# Patient Record
Sex: FEMALE | Race: WHITE | NOT HISPANIC OR LATINO
[De-identification: names, ages, dates, MRNs, and addresses within clinical notes are randomized per-mention and may not be internally consistent; named-entity substitution may affect disease eponyms.]

---

## 2018-03-09 ENCOUNTER — TRANSCRIPTION ENCOUNTER (OUTPATIENT)
Age: 48
End: 2018-03-09

## 2018-06-05 ENCOUNTER — TRANSCRIPTION ENCOUNTER (OUTPATIENT)
Age: 48
End: 2018-06-05

## 2018-06-09 ENCOUNTER — TRANSCRIPTION ENCOUNTER (OUTPATIENT)
Age: 48
End: 2018-06-09

## 2018-06-25 ENCOUNTER — TRANSCRIPTION ENCOUNTER (OUTPATIENT)
Age: 48
End: 2018-06-25

## 2018-10-23 ENCOUNTER — TRANSCRIPTION ENCOUNTER (OUTPATIENT)
Age: 48
End: 2018-10-23

## 2019-04-02 ENCOUNTER — TRANSCRIPTION ENCOUNTER (OUTPATIENT)
Age: 49
End: 2019-04-02

## 2020-01-14 ENCOUNTER — TRANSCRIPTION ENCOUNTER (OUTPATIENT)
Age: 50
End: 2020-01-14

## 2020-02-09 ENCOUNTER — TRANSCRIPTION ENCOUNTER (OUTPATIENT)
Age: 50
End: 2020-02-09

## 2020-05-01 ENCOUNTER — OUTPATIENT (OUTPATIENT)
Dept: OUTPATIENT SERVICES | Facility: HOSPITAL | Age: 50
LOS: 1 days | End: 2020-05-01
Payer: MEDICAID

## 2020-05-01 PROCEDURE — G9001: CPT

## 2020-05-05 DIAGNOSIS — Z71.89 OTHER SPECIFIED COUNSELING: ICD-10-CM

## 2020-12-07 ENCOUNTER — TRANSCRIPTION ENCOUNTER (OUTPATIENT)
Age: 50
End: 2020-12-07

## 2020-12-15 ENCOUNTER — TRANSCRIPTION ENCOUNTER (OUTPATIENT)
Age: 50
End: 2020-12-15

## 2020-12-15 ENCOUNTER — EMERGENCY (EMERGENCY)
Facility: HOSPITAL | Age: 50
LOS: 0 days | Discharge: HOME | End: 2020-12-16
Attending: EMERGENCY MEDICINE | Admitting: EMERGENCY MEDICINE
Payer: MEDICAID

## 2020-12-15 VITALS
TEMPERATURE: 98 F | DIASTOLIC BLOOD PRESSURE: 59 MMHG | HEART RATE: 84 BPM | SYSTOLIC BLOOD PRESSURE: 123 MMHG | RESPIRATION RATE: 18 BRPM | OXYGEN SATURATION: 95 %

## 2020-12-15 DIAGNOSIS — M54.6 PAIN IN THORACIC SPINE: ICD-10-CM

## 2020-12-15 DIAGNOSIS — R07.89 OTHER CHEST PAIN: ICD-10-CM

## 2020-12-15 DIAGNOSIS — Z88.1 ALLERGY STATUS TO OTHER ANTIBIOTIC AGENTS STATUS: ICD-10-CM

## 2020-12-15 LAB
ALBUMIN SERPL ELPH-MCNC: 4.4 G/DL — SIGNIFICANT CHANGE UP (ref 3.5–5.2)
ALP SERPL-CCNC: 109 U/L — SIGNIFICANT CHANGE UP (ref 30–115)
ALT FLD-CCNC: 16 U/L — SIGNIFICANT CHANGE UP (ref 0–41)
ANION GAP SERPL CALC-SCNC: 15 MMOL/L — HIGH (ref 7–14)
APTT BLD: 29.4 SEC — SIGNIFICANT CHANGE UP (ref 27–39.2)
AST SERPL-CCNC: 24 U/L — SIGNIFICANT CHANGE UP (ref 0–41)
BASOPHILS # BLD AUTO: 0.06 K/UL — SIGNIFICANT CHANGE UP (ref 0–0.2)
BASOPHILS NFR BLD AUTO: 1.1 % — HIGH (ref 0–1)
BILIRUB SERPL-MCNC: 0.8 MG/DL — SIGNIFICANT CHANGE UP (ref 0.2–1.2)
BUN SERPL-MCNC: 17 MG/DL — SIGNIFICANT CHANGE UP (ref 10–20)
CALCIUM SERPL-MCNC: 9.1 MG/DL — SIGNIFICANT CHANGE UP (ref 8.5–10.1)
CHLORIDE SERPL-SCNC: 102 MMOL/L — SIGNIFICANT CHANGE UP (ref 98–110)
CO2 SERPL-SCNC: 23 MMOL/L — SIGNIFICANT CHANGE UP (ref 17–32)
CREAT SERPL-MCNC: 0.7 MG/DL — SIGNIFICANT CHANGE UP (ref 0.7–1.5)
EOSINOPHIL # BLD AUTO: 0.23 K/UL — SIGNIFICANT CHANGE UP (ref 0–0.7)
EOSINOPHIL NFR BLD AUTO: 4 % — SIGNIFICANT CHANGE UP (ref 0–8)
GLUCOSE SERPL-MCNC: 84 MG/DL — SIGNIFICANT CHANGE UP (ref 70–99)
HCG SERPL QL: NEGATIVE — SIGNIFICANT CHANGE UP
HCT VFR BLD CALC: 36.5 % — LOW (ref 37–47)
HGB BLD-MCNC: 12.4 G/DL — SIGNIFICANT CHANGE UP (ref 12–16)
IMM GRANULOCYTES NFR BLD AUTO: 0.2 % — SIGNIFICANT CHANGE UP (ref 0.1–0.3)
INR BLD: 0.92 RATIO — SIGNIFICANT CHANGE UP (ref 0.65–1.3)
LYMPHOCYTES # BLD AUTO: 1.94 K/UL — SIGNIFICANT CHANGE UP (ref 1.2–3.4)
LYMPHOCYTES # BLD AUTO: 34 % — SIGNIFICANT CHANGE UP (ref 20.5–51.1)
MAGNESIUM SERPL-MCNC: 2 MG/DL — SIGNIFICANT CHANGE UP (ref 1.8–2.4)
MCHC RBC-ENTMCNC: 30.8 PG — SIGNIFICANT CHANGE UP (ref 27–31)
MCHC RBC-ENTMCNC: 34 G/DL — SIGNIFICANT CHANGE UP (ref 32–37)
MCV RBC AUTO: 90.8 FL — SIGNIFICANT CHANGE UP (ref 81–99)
MONOCYTES # BLD AUTO: 0.68 K/UL — HIGH (ref 0.1–0.6)
MONOCYTES NFR BLD AUTO: 11.9 % — HIGH (ref 1.7–9.3)
NEUTROPHILS # BLD AUTO: 2.79 K/UL — SIGNIFICANT CHANGE UP (ref 1.4–6.5)
NEUTROPHILS NFR BLD AUTO: 48.8 % — SIGNIFICANT CHANGE UP (ref 42.2–75.2)
NRBC # BLD: 0 /100 WBCS — SIGNIFICANT CHANGE UP (ref 0–0)
PLATELET # BLD AUTO: 205 K/UL — SIGNIFICANT CHANGE UP (ref 130–400)
POTASSIUM SERPL-MCNC: 4.8 MMOL/L — SIGNIFICANT CHANGE UP (ref 3.5–5)
POTASSIUM SERPL-SCNC: 4.8 MMOL/L — SIGNIFICANT CHANGE UP (ref 3.5–5)
PROT SERPL-MCNC: 7.6 G/DL — SIGNIFICANT CHANGE UP (ref 6–8)
PROTHROM AB SERPL-ACNC: 10.6 SEC — SIGNIFICANT CHANGE UP (ref 9.95–12.87)
RBC # BLD: 4.02 M/UL — LOW (ref 4.2–5.4)
RBC # FLD: 12.5 % — SIGNIFICANT CHANGE UP (ref 11.5–14.5)
SODIUM SERPL-SCNC: 140 MMOL/L — SIGNIFICANT CHANGE UP (ref 135–146)
TROPONIN T SERPL-MCNC: <0.01 NG/ML — SIGNIFICANT CHANGE UP
TROPONIN T SERPL-MCNC: <0.01 NG/ML — SIGNIFICANT CHANGE UP
WBC # BLD: 5.71 K/UL — SIGNIFICANT CHANGE UP (ref 4.8–10.8)
WBC # FLD AUTO: 5.71 K/UL — SIGNIFICANT CHANGE UP (ref 4.8–10.8)

## 2020-12-15 PROCEDURE — 71045 X-RAY EXAM CHEST 1 VIEW: CPT | Mod: 26

## 2020-12-15 PROCEDURE — 93010 ELECTROCARDIOGRAM REPORT: CPT | Mod: 77

## 2020-12-15 PROCEDURE — 93010 ELECTROCARDIOGRAM REPORT: CPT

## 2020-12-15 PROCEDURE — 99220: CPT

## 2020-12-15 NOTE — ED PROVIDER NOTE - PROGRESS NOTE DETAILS
I have personally performed a history and physical exam on this patient and personally directed the management of the patient. 50yo woman no significant PMH c/o upper back pain x 1 week and chest pressure/discomfort over the last 2 days, constant, without associated SOB, nausea, vomiting, sweating. No prior episodes, has not tried OTC meds for pain. No exertional component and no change with position. She has been tested for COVID multiple times this week (all negative) because she has been so concerned about sx. VS, exam as noted, pt nontoxic appearing, lungs CTA, CVS1S2 RRR abd soft, NT, no peripheral edema. Doubt ACS or PE as cause for sx, will check EKG/labs/CXR, likely CDU for low risk chest pain workup.

## 2020-12-15 NOTE — ED PROVIDER NOTE - COVID-19 ORDERING FACILITY
[facility]  [facility]

## 2020-12-15 NOTE — ED ADULT NURSE NOTE - CAS DISCH ACCOMP BY
Pt returning call, screening questions asked    Pt advised  will be asked to wait in car during appt Self

## 2020-12-15 NOTE — ED PROVIDER NOTE - CLINICAL SUMMARY MEDICAL DECISION MAKING FREE TEXT BOX
Labs, EKG, CXR ok. Doubt CAD, but pt has had multiple UCC and ED visits over these sx, will place in CDU for CCTA vs stress in the morning. Pt comfortable with plan.

## 2020-12-15 NOTE — ED PROVIDER NOTE - OBJECTIVE STATEMENT
48 yo F with no significant PMHx presents to the ED c/o mild upper back pain x 1 week and mid-sternal chest pressure x 2 days. Symptoms are constant and not worsened with exertion. She went to [facility] today and was advised to go to ED for further workup. She denies hx of cardiac workup in the past. She denies aggravating/alleviating factors. She denies taking medication to improve symptoms. Pt denies fever, chills, nausea, vomiting, abdominal pain, diarrhea, headache, dizziness, weakness, SOB, LOC, trauma, urinary symptoms, cough, calf pain/swelling, recent travel, recent surgery.

## 2020-12-15 NOTE — ED CDU PROVIDER INITIAL DAY NOTE - ATTENDING CONTRIBUTION TO CARE
48 y/o f w/ no pmhx presented for chest pain radiating to mid- back, for ~ 1 week, currently asymptomatic. No fever, chills, n/v, sob, pleuritic cp, palpitations, diaphoresis, cough, ha/lh/dizziness, numbness/tingling, neck pain/ stiffness, abd pain, diarrhea, constipation, melena/brbpr, urinary symptoms, trauma, weakness, edema, calf pain/swelling/erythema, sick contacts, recent travel or rash.   Family hx of Brother with MI. Does not have a cardiologist.     on exam: female pt sitting on stretcher speaking full sentences, no rash, mmm, neck supple. non-tender , radial pulses 2/4 b/l, no jvd, no pain to palpation to chest wall, no pain with movement/ not reproducible, ctabl w/ breath sounds present b/l, no wheezing or crackles, no accessory muscle use, no tachypnea, no stridor, bs present throughout all 4 quadrants, abd soft, nd, nt, no rebound tenderness or guarding, no cvat, FROM of ext, no spinous ttp, no palpable shelves or step offs, no calf pain/swelling/erythema, AAOx3. motor 5/5 and sensation intact throughout upper and lower ext. no focal deficits. NIH 0.     serum preg negative, ekg x 2 with no ischemic changes, trop x 2 negative, cxr negative, pending ccta, will continue to monitor and reassess. pt aware of plan and agrees.

## 2020-12-15 NOTE — ED CDU PROVIDER INITIAL DAY NOTE - MEDICAL DECISION MAKING DETAILS
serum preg negative, ekg x 2 with no ischemic changes, trop x 2 negative, cxr negative, pending ccta, will continue to monitor and reassess. pt aware of plan and agrees.

## 2020-12-15 NOTE — ED CDU PROVIDER INITIAL DAY NOTE - OBJECTIVE STATEMENT
50y/o female with no significant pmh, pt. c/o mid sternal cp associated with mid back pain for a bout 1 wk. pt. denies sob, fever, chills, cough, dizziness, vomiting, abdominal pain. cp is not exertional, not related to eating, not pleuritic. pt. does not have a cardiologist. pt.'s brother had mi in his 50s.

## 2020-12-16 VITALS — HEART RATE: 60 BPM | DIASTOLIC BLOOD PRESSURE: 59 MMHG | SYSTOLIC BLOOD PRESSURE: 104 MMHG

## 2020-12-16 PROCEDURE — 75574 CT ANGIO HRT W/3D IMAGE: CPT | Mod: 26

## 2020-12-16 PROCEDURE — 99217: CPT

## 2020-12-16 RX ORDER — ACETAMINOPHEN 500 MG
650 TABLET ORAL ONCE
Refills: 0 | Status: COMPLETED | OUTPATIENT
Start: 2020-12-16 | End: 2020-12-16

## 2020-12-16 RX ORDER — METOPROLOL TARTRATE 50 MG
100 TABLET ORAL ONCE
Refills: 0 | Status: COMPLETED | OUTPATIENT
Start: 2020-12-16 | End: 2020-12-16

## 2020-12-16 RX ORDER — SODIUM CHLORIDE 9 MG/ML
1000 INJECTION INTRAMUSCULAR; INTRAVENOUS; SUBCUTANEOUS ONCE
Refills: 0 | Status: COMPLETED | OUTPATIENT
Start: 2020-12-16 | End: 2020-12-16

## 2020-12-16 RX ADMIN — Medication 100 MILLIGRAM(S): at 07:57

## 2020-12-16 RX ADMIN — Medication 650 MILLIGRAM(S): at 07:57

## 2020-12-16 RX ADMIN — SODIUM CHLORIDE 1000 MILLILITER(S): 9 INJECTION INTRAMUSCULAR; INTRAVENOUS; SUBCUTANEOUS at 06:34

## 2020-12-16 NOTE — ED CDU PROVIDER SUBSEQUENT DAY NOTE - PROGRESS NOTE DETAILS
pt. in nad, remains on cardiac monitor. trops negative x2. will likely ccta in the morning. Pt seen at bedside, NAD. Arrived overnight, received from VICKI Vicente. Pt presenting for chest pain associated with back pain. Cardiac enzymes negative x 2. Pt schedule for CCTA this am.

## 2020-12-16 NOTE — ED CDU PROVIDER DISPOSITION NOTE - ATTENDING CONTRIBUTION TO CARE
48 y/o f w/ no pmhx presented for chest pain radiating to mid- back, for ~ 1 week, currently asymptomatic. No fever, chills, n/v, sob, pleuritic cp, palpitations, diaphoresis, cough, ha/lh/dizziness, numbness/tingling, neck pain/ stiffness, abd pain, diarrhea, constipation, melena/brbpr, urinary symptoms, trauma, weakness, edema, calf pain/swelling/erythema, sick contacts, recent travel or rash.   Family hx of Brother with MI. Does not have a cardiologist.     on exam: female pt sitting on stretcher speaking full sentences, no rash, mmm, neck supple. non-tender , radial pulses 2/4 b/l, no jvd, no pain to palpation to chest wall, no pain with movement/ not reproducible, ctabl w/ breath sounds present b/l, no wheezing or crackles, no accessory muscle use, no tachypnea, no stridor, bs present throughout all 4 quadrants, abd soft, nd, nt, no rebound tenderness or guarding, no cvat, FROM of ext, no spinous ttp, no palpable shelves or step offs, no calf pain/swelling/erythema, AAOx3. motor 5/5 and sensation intact throughout upper and lower ext. no focal deficits. NIH 0.     serum preg negative, ekg x 2 with no ischemic changes, trop x 2 negative, cxr negative, CCTA CAD RAD (-), pt aware. Shared decision making utilized and HEART score discussed.  Patient electing for outpatient management and will follow up immediately with cardiology.   Strict return precautions discussed and patient aware they can return at any time for re-evaluation and possible admission. EKG and results discussed. will follow up with pmd and cardiologists, understands importance of obtaining one. reports will follow up.

## 2020-12-16 NOTE — ED CDU PROVIDER DISPOSITION NOTE - CARE PROVIDER_API CALL
Ahsan Stevens  [facility]  [address] [place] [address]  Phone: [phone]  Fax: [phone]  Follow Up Time: 4-6 Days   Ahsan Stevens  FAMILY MEDICINE  [address] [place] [address]  Phone: [phone]  Fax: [phone]  Follow Up Time: 4-6 Days    Vipul Brandon  CARDIOVASCULAR DISEASE  [address] [place] [address], [place] [address]  Phone: [phone]  Fax: [phone]  Follow Up Time: 1-3 Days

## 2020-12-16 NOTE — ED CDU PROVIDER DISPOSITION NOTE - CARE PROVIDERS DIRECT ADDRESSES
[email or web address]

## 2020-12-16 NOTE — ED CDU PROVIDER SUBSEQUENT DAY NOTE - MEDICAL DECISION MAKING DETAILS
serum preg negative, ekg x 2 with no ischemic changes, trop x 2 negative, cxr negative, CCTA CAD RAD (-), pt aware. Shared decision making utilized and HEART score discussed.  Patient electing for outpatient management and will follow up immediately with cardiology.   Strict return precautions discussed and patient aware they can return at any time for re-evaluation and possible admission. EKG and results discussed. will follow up with pmd and cardiologists, understands importance of obtaining one. reports will follow up.

## 2020-12-16 NOTE — ED CDU PROVIDER DISPOSITION NOTE - PATIENT PORTAL LINK FT
You can access the FollowMyHealth Patient Portal offered by [facility] by registering at the following website: [email or web address]. By joining [employer]’s FollowMyHealth portal, you will also be able to view your health information using other applications (apps) compatible with our system.

## 2020-12-16 NOTE — ED CDU PROVIDER DISPOSITION NOTE - PROVIDER TOKENS
PROVIDER:[TOKEN:[[id number]],FOLLOWUP:[4-6 Days]] PROVIDER:[TOKEN:[[id number]],FOLLOWUP:[4-6 Days]],PROVIDER:[TOKEN:[[id number]],FOLLOWUP:[1-3 Days]]

## 2020-12-16 NOTE — ED CDU PROVIDER SUBSEQUENT DAY NOTE - ATTENDING CONTRIBUTION TO CARE
50 y/o f w/ no pmhx presented for chest pain radiating to mid- back, for ~ 1 week, currently asymptomatic. No fever, chills, n/v, sob, pleuritic cp, palpitations, diaphoresis, cough, ha/lh/dizziness, numbness/tingling, neck pain/ stiffness, abd pain, diarrhea, constipation, melena/brbpr, urinary symptoms, trauma, weakness, edema, calf pain/swelling/erythema, sick contacts, recent travel or rash.   Family hx of Brother with MI. Does not have a cardiologist.     on exam: female pt sitting on stretcher speaking full sentences, no rash, mmm, neck supple. non-tender , radial pulses 2/4 b/l, no jvd, no pain to palpation to chest wall, no pain with movement/ not reproducible, ctabl w/ breath sounds present b/l, no wheezing or crackles, no accessory muscle use, no tachypnea, no stridor, bs present throughout all 4 quadrants, abd soft, nd, nt, no rebound tenderness or guarding, no cvat, FROM of ext, no spinous ttp, no palpable shelves or step offs, no calf pain/swelling/erythema, AAOx3. motor 5/5 and sensation intact throughout upper and lower ext. no focal deficits. NIH 0.     serum preg negative, ekg x 2 with no ischemic changes, trop x 2 negative, cxr negative, CCTA CAD RAD (-), pt aware. Shared decision making utilized and HEART score discussed.  Patient electing for outpatient management and will follow up immediately with cardiology.   Strict return precautions discussed and patient aware they can return at any time for re-evaluation and possible admission. EKG and results discussed.

## 2021-02-02 ENCOUNTER — TRANSCRIPTION ENCOUNTER (OUTPATIENT)
Age: 51
End: 2021-02-02

## 2021-04-28 ENCOUNTER — TRANSCRIPTION ENCOUNTER (OUTPATIENT)
Age: 51
End: 2021-04-28

## 2021-07-02 ENCOUNTER — TRANSCRIPTION ENCOUNTER (OUTPATIENT)
Age: 51
End: 2021-07-02

## 2021-10-01 ENCOUNTER — TRANSCRIPTION ENCOUNTER (OUTPATIENT)
Age: 51
End: 2021-10-01

## 2021-10-11 ENCOUNTER — TRANSCRIPTION ENCOUNTER (OUTPATIENT)
Age: 51
End: 2021-10-11

## 2021-10-15 PROBLEM — Z78.9 OTHER SPECIFIED HEALTH STATUS: Chronic | Status: ACTIVE | Noted: 2020-12-15

## 2021-10-20 ENCOUNTER — RESULT REVIEW (OUTPATIENT)
Age: 51
End: 2021-10-20

## 2021-10-20 ENCOUNTER — OUTPATIENT (OUTPATIENT)
Dept: OUTPATIENT SERVICES | Facility: HOSPITAL | Age: 51
LOS: 1 days | Discharge: HOME | End: 2021-10-20
Payer: MEDICAID

## 2021-10-20 ENCOUNTER — APPOINTMENT (OUTPATIENT)
Dept: ORTHOPEDIC SURGERY | Facility: CLINIC | Age: 51
End: 2021-10-20

## 2021-10-20 PROBLEM — Z00.00 ENCOUNTER FOR PREVENTIVE HEALTH EXAMINATION: Status: ACTIVE | Noted: 2021-10-20

## 2021-10-20 PROCEDURE — 73562 X-RAY EXAM OF KNEE 3: CPT | Mod: 26,LT

## 2021-11-17 ENCOUNTER — NON-APPOINTMENT (OUTPATIENT)
Age: 51
End: 2021-11-17

## 2021-11-17 ENCOUNTER — APPOINTMENT (OUTPATIENT)
Dept: ORTHOPEDIC SURGERY | Facility: CLINIC | Age: 51
End: 2021-11-17

## 2021-11-17 ENCOUNTER — OUTPATIENT (OUTPATIENT)
Dept: OUTPATIENT SERVICES | Facility: HOSPITAL | Age: 51
LOS: 1 days | Discharge: HOME | End: 2021-11-17

## 2021-11-17 DIAGNOSIS — S93.491A SPRAIN OF OTHER LIGAMENT OF RIGHT ANKLE, INITIAL ENCOUNTER: ICD-10-CM

## 2021-11-17 DIAGNOSIS — S89.92XD UNSPECIFIED INJURY OF LEFT LOWER LEG, SUBSEQUENT ENCOUNTER: ICD-10-CM

## 2021-11-24 ENCOUNTER — RESULT REVIEW (OUTPATIENT)
Age: 51
End: 2021-11-24

## 2021-11-24 ENCOUNTER — OUTPATIENT (OUTPATIENT)
Dept: OUTPATIENT SERVICES | Facility: HOSPITAL | Age: 51
LOS: 1 days | Discharge: HOME | End: 2021-11-24
Payer: MEDICAID

## 2021-11-24 DIAGNOSIS — M25.562 PAIN IN LEFT KNEE: ICD-10-CM

## 2021-11-24 PROCEDURE — 73721 MRI JNT OF LWR EXTRE W/O DYE: CPT | Mod: 26,LT

## 2021-11-30 NOTE — ED CDU PROVIDER INITIAL DAY NOTE - CARDIOVASCULAR NEGATIVE STATEMENT, MLM
Ambulatory Care Coordination Note  11/30/2021  CM Risk Score: 1  Charlson 10 Year Mortality Risk Score: 47%     ACC: José Mishra, RN    Summary Note: Denies s/s of Hypo/Hyperglycemia. Discussed DM Zone Tool and verbalizes understanding. FBS:  199  States it is always a little high in the mornings. Discussed snacks that she has before bed. Denies falls/near falls    FOLLOW-UP PLAN:    Discuss:   -DM Management/Zone Tool  -Current Blood Sugar  -Appointment Reminders    Next Anticipated Outreach by [address] Team:  3 Weeks        Care Coordination Interventions    Program Enrollment: Complex Care  Referral from Primary Care Provider: No  Suggested Interventions and Community Resources  Diabetes Education: Completed (Comment: Will refer)  Fall Risk Prevention: In Process  Medication Assistance Program: Completed  Medi Set or Pill Pack: Declined  Pharmacist: Declined  Registered Dietician: Declined  Social Work: Declined  Transportation Support: Declined  Zone Management Tools: In Process         Goals Addressed                 This Visit's Progress     Conditions and Symptoms   On track     I will schedule office visits, as directed by my provider. I will keep my appointment or reschedule if I have to cancel. I will notify my provider of any barriers to my plan of care. I will follow my Zone Management tool to seek urgent or emergent care. I will notify my provider of any symptoms that indicate a worsening of my condition. Barriers: lack of support and lack of education  Plan for overcoming my barriers: Open communication with Care Team.  Ask questions and voice concerns. Pt would like to enroll in DM Education Courses  Confidence: 10/10  Anticipated Goal Completion Date: 10/16/21         Medication Management   On track     I will take my medication as directed. I will notify my provider of any problems with medications, like adverse effects or side effects.   I will notify my provider/Care Coordinator if I am unable to afford my medications. I will notify my provider for advice before I stop taking any of my medication. Barriers: lack of education  Plan for overcoming my barriers: Ask Questions. Voice Concerns. Notify Care Team of any medication changes/additions  Confidence: 10/10  Anticipated Goal Completion Date: 10/16/21            Prior to Admission medications    Medication Sig Start Date End Date Taking?  Authorizing Provider   lovastatin (MEVACOR) 20 MG tablet TAKE 1 TABLET BY MOUTH EVERY DAY AT NIGHT 10/14/21   Leo Lugo MD   glipiZIDE (GLUCOTROL) 10 MG tablet TAKE 2 TABLETS BY MOUTH EVERY DAY 10/14/21   Leo Lugo MD   SITagliptin (JANUVIA) 100 MG tablet Take 1 tablet by mouth daily 10/14/21   Leo Lugo MD   atenolol (TENORMIN) 50 MG tablet TAKE 1 TABLET BY MOUTH EVERY DAY 7/26/21   Leo Lugo MD   amLODIPine (NORVASC) 5 MG tablet TAKE 1 TABLET BY MOUTH EVERY DAY 7/26/21   Leo Lugo MD   dorzolamide-timolol (COSOPT) 22.3-6.8 MG/ML ophthalmic solution Place 1 drop into both eyes 2 times daily    Historical Provider, MD   blood glucose test strips (ONE TOUCH ULTRA TEST) strip Check blood sugar tid 2/24/21   Leo Lugo MD   [facility] Natural Products (OSTEO BI-FLEX ADV TRIPLE ST) TABS Take 1 tablet by mouth daily 2/24/21   Leo Lugo MD   [facility] LANCETS MISC Check blood sugar tid 2/4/20   Leo Lugo MD   Blood Glucose Monitoring Suppl (ONE TOUCH ULTRA 2) w/Device KIT Check blood sugar tid 2/4/20   Leo Lugo MD   diclofenac sodium 1 % GEL Apply 2 g topically 2 times daily 2/4/20   Leo Lugo MD   AZO-CRANBERRY PO Take by mouth    Historical Provider, MD       Future Appointments   Date Time Provider Hakeem Weinstein   1/17/2022  9:45 AM [facility]      and   Diabetes Assessment    Medic Alert ID: No  Meal Planning: Avoidance of concentrated sweets   How often do you test your blood sugar?: Meals   Do you have barriers with adherence to non-pharmacologic self-management interventions?  (Nutrition/Exercise/Self-Monitoring): No   Have you ever had to go to the ED for symptoms of low blood sugar?: No + chest pain

## 2022-01-26 ENCOUNTER — APPOINTMENT (OUTPATIENT)
Dept: ORTHOPEDIC SURGERY | Facility: CLINIC | Age: 52
End: 2022-01-26

## 2022-01-26 ENCOUNTER — OUTPATIENT (OUTPATIENT)
Dept: OUTPATIENT SERVICES | Facility: HOSPITAL | Age: 52
LOS: 1 days | Discharge: HOME | End: 2022-01-26

## 2023-03-22 ENCOUNTER — OUTPATIENT (OUTPATIENT)
Dept: OUTPATIENT SERVICES | Facility: HOSPITAL | Age: 53
LOS: 1 days | End: 2023-03-22
Payer: MEDICAID

## 2023-03-22 DIAGNOSIS — R07.9 CHEST PAIN, UNSPECIFIED: ICD-10-CM

## 2023-03-22 DIAGNOSIS — Z00.8 ENCOUNTER FOR OTHER GENERAL EXAMINATION: ICD-10-CM

## 2023-03-22 PROCEDURE — 78452 HT MUSCLE IMAGE SPECT MULT: CPT

## 2023-03-22 PROCEDURE — 78452 HT MUSCLE IMAGE SPECT MULT: CPT | Mod: 26

## 2023-03-22 PROCEDURE — A9500: CPT

## 2023-03-22 PROCEDURE — 93018 CV STRESS TEST I&R ONLY: CPT

## 2023-03-23 DIAGNOSIS — R07.9 CHEST PAIN, UNSPECIFIED: ICD-10-CM

## 2023-03-28 DIAGNOSIS — R07.9 CHEST PAIN, UNSPECIFIED: ICD-10-CM

## 2023-03-29 DIAGNOSIS — R07.9 CHEST PAIN, UNSPECIFIED: ICD-10-CM

## 2023-03-30 ENCOUNTER — TRANSCRIPTION ENCOUNTER (OUTPATIENT)
Age: 53
End: 2023-03-30

## 2023-06-29 ENCOUNTER — NON-APPOINTMENT (OUTPATIENT)
Age: 53
End: 2023-06-29

## 2024-04-08 NOTE — ED PROVIDER NOTE - COVID-19 RESULT
Oxygen order and pap accessory [facility]   NEGATIVE

## 2024-04-28 ENCOUNTER — NON-APPOINTMENT (OUTPATIENT)
Age: 54
End: 2024-04-28

## 2025-05-28 ENCOUNTER — NON-APPOINTMENT (OUTPATIENT)
Age: 55
End: 2025-05-28

## 2025-06-30 ENCOUNTER — EMERGENCY (EMERGENCY)
Facility: HOSPITAL | Age: 55
LOS: 0 days | Discharge: HOME | End: 2025-06-30
Attending: EMERGENCY MEDICINE
Payer: MEDICAID

## 2025-06-30 ENCOUNTER — NON-APPOINTMENT (OUTPATIENT)
Age: 55
End: 2025-06-30

## 2025-06-30 VITALS
SYSTOLIC BLOOD PRESSURE: 129 MMHG | WEIGHT: 214.95 LBS | OXYGEN SATURATION: 98 % | RESPIRATION RATE: 16 BRPM | TEMPERATURE: 98 F | DIASTOLIC BLOOD PRESSURE: 82 MMHG | HEART RATE: 78 BPM | HEIGHT: 65 IN

## 2025-06-30 DIAGNOSIS — R11.0 NAUSEA: ICD-10-CM

## 2025-06-30 DIAGNOSIS — Z88.2 ALLERGY STATUS TO SULFONAMIDES: ICD-10-CM

## 2025-06-30 DIAGNOSIS — Z90.710 ACQUIRED ABSENCE OF BOTH CERVIX AND UTERUS: ICD-10-CM

## 2025-06-30 DIAGNOSIS — J45.909 UNSPECIFIED ASTHMA, UNCOMPLICATED: ICD-10-CM

## 2025-06-30 DIAGNOSIS — R07.2 PRECORDIAL PAIN: ICD-10-CM

## 2025-06-30 DIAGNOSIS — Z87.891 PERSONAL HISTORY OF NICOTINE DEPENDENCE: ICD-10-CM

## 2025-06-30 DIAGNOSIS — Z88.8 ALLERGY STATUS TO OTHER DRUGS, MEDICAMENTS AND BIOLOGICAL SUBSTANCES: ICD-10-CM

## 2025-06-30 LAB
ALBUMIN SERPL ELPH-MCNC: 4.5 G/DL — SIGNIFICANT CHANGE UP (ref 3.5–5.2)
ALP SERPL-CCNC: 118 U/L — HIGH (ref 30–115)
ALT FLD-CCNC: 24 U/L — SIGNIFICANT CHANGE UP (ref 0–41)
ANION GAP SERPL CALC-SCNC: 14 MMOL/L — SIGNIFICANT CHANGE UP (ref 7–14)
AST SERPL-CCNC: 24 U/L — SIGNIFICANT CHANGE UP (ref 0–41)
BASOPHILS # BLD AUTO: 0.06 K/UL — SIGNIFICANT CHANGE UP (ref 0–0.2)
BASOPHILS NFR BLD AUTO: 0.8 % — SIGNIFICANT CHANGE UP (ref 0–1)
BILIRUB SERPL-MCNC: 0.6 MG/DL — SIGNIFICANT CHANGE UP (ref 0.2–1.2)
BUN SERPL-MCNC: 8 MG/DL — LOW (ref 10–20)
CALCIUM SERPL-MCNC: 9.2 MG/DL — SIGNIFICANT CHANGE UP (ref 8.4–10.5)
CHLORIDE SERPL-SCNC: 101 MMOL/L — SIGNIFICANT CHANGE UP (ref 98–110)
CO2 SERPL-SCNC: 24 MMOL/L — SIGNIFICANT CHANGE UP (ref 17–32)
CREAT SERPL-MCNC: 0.6 MG/DL — LOW (ref 0.7–1.5)
EGFR: 105 ML/MIN/1.73M2 — SIGNIFICANT CHANGE UP
EGFR: 105 ML/MIN/1.73M2 — SIGNIFICANT CHANGE UP
EOSINOPHIL # BLD AUTO: 0.25 K/UL — SIGNIFICANT CHANGE UP (ref 0–0.7)
EOSINOPHIL NFR BLD AUTO: 3.3 % — SIGNIFICANT CHANGE UP (ref 0–8)
GLUCOSE SERPL-MCNC: 83 MG/DL — SIGNIFICANT CHANGE UP (ref 70–99)
HCT VFR BLD CALC: 38.9 % — SIGNIFICANT CHANGE UP (ref 37–47)
HGB BLD-MCNC: 13.4 G/DL — SIGNIFICANT CHANGE UP (ref 12–16)
IMM GRANULOCYTES NFR BLD AUTO: 0.3 % — SIGNIFICANT CHANGE UP (ref 0.1–0.3)
LYMPHOCYTES # BLD AUTO: 2.2 K/UL — SIGNIFICANT CHANGE UP (ref 1.2–3.4)
LYMPHOCYTES # BLD AUTO: 29.3 % — SIGNIFICANT CHANGE UP (ref 20.5–51.1)
MCHC RBC-ENTMCNC: 30.2 PG — SIGNIFICANT CHANGE UP (ref 27–31)
MCHC RBC-ENTMCNC: 34.4 G/DL — SIGNIFICANT CHANGE UP (ref 32–37)
MCV RBC AUTO: 87.6 FL — SIGNIFICANT CHANGE UP (ref 81–99)
MONOCYTES # BLD AUTO: 0.83 K/UL — HIGH (ref 0.1–0.6)
MONOCYTES NFR BLD AUTO: 11.1 % — HIGH (ref 1.7–9.3)
NEUTROPHILS # BLD AUTO: 4.14 K/UL — SIGNIFICANT CHANGE UP (ref 1.4–6.5)
NEUTROPHILS NFR BLD AUTO: 55.2 % — SIGNIFICANT CHANGE UP (ref 42.2–75.2)
NRBC BLD AUTO-RTO: 0 /100 WBCS — SIGNIFICANT CHANGE UP (ref 0–0)
PLATELET # BLD AUTO: 203 K/UL — SIGNIFICANT CHANGE UP (ref 130–400)
PMV BLD: 10.8 FL — HIGH (ref 7.4–10.4)
POTASSIUM SERPL-MCNC: 4.6 MMOL/L — SIGNIFICANT CHANGE UP (ref 3.5–5)
POTASSIUM SERPL-SCNC: 4.6 MMOL/L — SIGNIFICANT CHANGE UP (ref 3.5–5)
PROT SERPL-MCNC: 7.2 G/DL — SIGNIFICANT CHANGE UP (ref 6–8)
RBC # BLD: 4.44 M/UL — SIGNIFICANT CHANGE UP (ref 4.2–5.4)
RBC # FLD: 12.7 % — SIGNIFICANT CHANGE UP (ref 11.5–14.5)
SODIUM SERPL-SCNC: 139 MMOL/L — SIGNIFICANT CHANGE UP (ref 135–146)
TROPONIN T, HIGH SENSITIVITY RESULT: <6 NG/L — SIGNIFICANT CHANGE UP (ref 6–13)
TROPONIN T, HIGH SENSITIVITY RESULT: <6 NG/L — SIGNIFICANT CHANGE UP (ref 6–13)
WBC # BLD: 7.5 K/UL — SIGNIFICANT CHANGE UP (ref 4.8–10.8)
WBC # FLD AUTO: 7.5 K/UL — SIGNIFICANT CHANGE UP (ref 4.8–10.8)

## 2025-06-30 PROCEDURE — 99285 EMERGENCY DEPT VISIT HI MDM: CPT | Mod: 25

## 2025-06-30 PROCEDURE — 93010 ELECTROCARDIOGRAM REPORT: CPT | Mod: 76

## 2025-06-30 PROCEDURE — 71045 X-RAY EXAM CHEST 1 VIEW: CPT | Mod: 26

## 2025-06-30 PROCEDURE — 93005 ELECTROCARDIOGRAM TRACING: CPT

## 2025-06-30 PROCEDURE — 85025 COMPLETE CBC W/AUTO DIFF WBC: CPT

## 2025-06-30 PROCEDURE — 71045 X-RAY EXAM CHEST 1 VIEW: CPT

## 2025-06-30 PROCEDURE — 80053 COMPREHEN METABOLIC PANEL: CPT

## 2025-06-30 PROCEDURE — 99285 EMERGENCY DEPT VISIT HI MDM: CPT

## 2025-06-30 PROCEDURE — 36415 COLL VENOUS BLD VENIPUNCTURE: CPT

## 2025-06-30 PROCEDURE — 84484 ASSAY OF TROPONIN QUANT: CPT

## 2025-06-30 PROCEDURE — 96374 THER/PROPH/DIAG INJ IV PUSH: CPT

## 2025-06-30 RX ORDER — ONDANSETRON HCL/PF 4 MG/2 ML
4 VIAL (ML) INJECTION ONCE
Refills: 0 | Status: COMPLETED | OUTPATIENT
Start: 2025-06-30 | End: 2025-06-30

## 2025-06-30 RX ADMIN — Medication 4 MILLIGRAM(S): at 16:11

## 2025-06-30 NOTE — ED ADULT NURSE NOTE - NSICDXFAMILYHX_GEN_ALL_CORE_FT
FAMILY HISTORY:  Sibling  Still living? No  Family history of myocardial infarction, Age at diagnosis: 51-60

## 2025-06-30 NOTE — ED PROVIDER NOTE - OBJECTIVE STATEMENT
54-year-old female past medical history of of allergies presents today for acute episode of atypical chest pain.  Patient was sitting in her desk when she had a substernal sharp pain nonradiating associated with nausea but no vomiting.  Patient denied any exertional component to it denied any difficulty breathing afterwards no leg swelling no recent travel no fevers or chills

## 2025-06-30 NOTE — ED PROVIDER NOTE - ATTENDING CONTRIBUTION TO CARE
54-year-old female past medical history of asthma, history of hysterectomy, negative stress test 2023 presenting to ED for evaluation of chest pain.  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  Pain was nonradiating, resolved spontaneously.  No associated additional complaints. No family history of early CAD or sudden cardiac death, no personal history of PE or DVT, no PE risk factors.  Well-appearing well-nourished, NAD, head AT/NC, PERRL, pink conjunctivae,  mmm, nml oropharynx, nml phonation without drooling or trismus, supple neck , nml work of breathing, lungs CTA b/l, equal air entry, RRR, well-perfused extremities, distal pulses intact, abdomen soft, NT/ND, BS present in all quadrants, no midline spine or CVA ttp, no leg edema or unilateral calf swelling, A&Ox3, no focal neuro deficits, nml mood and affect.   Impression/plan:  atypical CP, will obtain EKG, chest x-ray, labs including troponin and reassess.  Patient and her sister are amenable with plan.

## 2025-06-30 NOTE — ED ADULT NURSE NOTE - NSFALLRISKINTERV_ED_ALL_ED

## 2025-06-30 NOTE — ED PROVIDER NOTE - CONDITION AT DISCHARGE:
March 5, 2019         Patient: Marylin Herrera   YOB: 2017   Date of Visit: 3/5/2019           To Whom it May Concern:    Marylin Herrera was seen in my clinic on 3/5/2019. She may return to school in 3-5 days.    If you have any questions or concerns, please don't hesitate to call.        Sincerely,           Dayo Be M.D.  Electronically Signed      Improved

## 2025-06-30 NOTE — ED PROVIDER NOTE - CLINICAL SUMMARY MEDICAL DECISION MAKING FREE TEXT BOX
54-year-old female complaining of chest pain.  In the emergency department had screening exam, labs and imaging, trended cardiac biomarkers, no acute emergent findings, will discharge with outpatient management return precautions.

## 2025-06-30 NOTE — ED PROVIDER NOTE - PATIENT PORTAL LINK FT
You can access the FollowMyHealth Patient Portal offered by [facility] by registering at the following website: [email or web address]. By joining [employer]’s FollowMyHealth portal, you will also be able to view your health information using other applications (apps) compatible with our system.

## 2025-06-30 NOTE — ED PROVIDER NOTE - IV ALTEPLASE EXCL ABS HIDDEN
[facility]  [address]  [place] [address]  Phone: [phone]  Fax: [phone]                  Sarah Monahan   3/10/2017 10:40 AM   Office Visit    Description:  Female : 1964   Provider:  Pro Darby IV, MD   Department:  [facility]           Reason for Visit     Other           Diagnoses this Visit        Comments    Renal stone    -  Primary            To Do List           Future Appointments        Provider Department Dept Phone    3/29/2017 2:15 PM ONL XR1- [facility] [phone]    3/29/2017 3:00 PM David Toscano MD [facility] [phone]      Goals (5 Years of Data)     None      [facility]     [facility] On [facility] Nurse Care Line -  Assistance  Registered nurses in the [facility] provide clinical advisement, health education, appointment booking, and other advisory services.  Call for this free service at [phone].             Medications           Message regarding Medications     Verify the changes and/or additions to your medication regime listed below are the same as discussed with your clinician today.  If any of these changes or additions are incorrect, please notify your healthcare provider.             Verify that the below list of medications is an accurate representation of the medications you are currently taking.  If none reported, the list may be blank. If incorrect, please contact your healthcare provider. Carry this list with you in case of emergency.           Current Medications     ADVAIR DISKUS 500-50 mcg/dose DsDv diskus inhaler INHALE ONE DOSE BY MOUTH TWICE DAILY    albuterol (ACCUNEB) 1.25 mg/3 mL Nebu Take 3 mLs (1.25 mg total) by nebulization 3 (three) times daily as needed.    albuterol 90 mcg/actuation inhaler Inhale 2 puffs into the lungs every 4 to 6 hours as needed for Wheezing.    baclofen (LIORESAL) 10 MG tablet Take 10 mg by mouth.     clonazePAM (KLONOPIN) 1 MG tablet Take 1 mg by mouth.    ergocalciferol (ERGOCALCIFEROL) 50,000 unit Cap Take 50,000 Units by mouth.    estradiol 10 mcg Tab Use twice week    inhalation device (VORTEX HOLDING CHAMBER) Use as directed for inhalation. For use with albuterol inhaler.    omeprazole (PRILOSEC) 40 MG capsule Take 40 mg by mouth.    ondansetron (ZOFRAN-ODT) 8 MG TbDL     quetiapine (SEROQUEL) 50 MG tablet     ranitidine (ZANTAC) 150 MG tablet     roflumilast (DALIRESP) 500 mcg Tab Take 1 tablet (500 mcg total) by mouth once daily. Medically necessary    trazodone (DESYREL) 150 MG tablet     umeclidinium (INCRUSE ELLIPTA) 62.5 mcg/actuation DsDv Inhale 62.5 mcg into the lungs once daily. Medically necessary           Clinical Reference Information           Your Vitals Were     BP Weight BMI          132/88 (BP Location: Left arm, Patient Position: Sitting, BP Method: Manual) 74.8 kg (165 lb) 28.32 kg/m2        Blood Pressure          Most Recent Value    BP  132/88      Allergies as of 3/10/2017     Macrodantin [Nitrofurantoin Macrocrystalline]    Nitrofurantoin    Nitrofurantoin Monohyd/m-cryst      Immunizations Administered on Date of Encounter - 3/10/2017     None      Orders Placed During Today's Visit      Normal Orders This Visit    POCT urine dipstick without microscope     Future Labs/Procedures Expected by Expires    Basic metabolic panel  3/10/2017 5/9/2018    CBC auto differential  3/10/2017 5/9/2018         3/10/2017 11:53 AM - Anna Lozano LPN      Component Results     Component    Color    yellow    Spec Grav    1.025    pH, UA    6    WBC, UA    neg    Nitrite    neg    Protein    neg    Glucose, UA    neg    Ketones, UA    neg    Urobilinogen    neg    Bilirubin    neg    Blood, UA    neg            Language Assistance Services     ATTENTION: Language assistance services are available, free of charge. Please call [phone].      ATENCIÓN: rufina Jamil  disposición servicios gratuitos de asistencia lingüística. Nancy al [phone].     TATY Ý: N?u b?n nói Ti?ng Vi?t, có các d?ch v? h? tr? ngôn ng? mi?n phí dành cho b?n. G?i s? [phone].         [facility] complies with applicable Federal civil rights laws and does not discriminate on the basis of race, color, national origin, age, disability, or sex.         show

## 2025-06-30 NOTE — ED PROVIDER NOTE - NSFOLLOWUPINSTRUCTIONS_ED_ALL_ED_FT
Our Emergency Department Referral Coordinators will be reaching out to you in the next 24-48 hours from 9:00am to 5:00pm to schedule a follow up appointment. Please expect a phone call from the hospital in that time frame. If you do not receive a call or if you have any questions or concerns, you can reach them at   [phone] 311[facility].